# Patient Record
Sex: MALE | Race: WHITE | NOT HISPANIC OR LATINO | URBAN - METROPOLITAN AREA
[De-identification: names, ages, dates, MRNs, and addresses within clinical notes are randomized per-mention and may not be internally consistent; named-entity substitution may affect disease eponyms.]

---
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
## 2025-07-08 NOTE — PROGRESS NOTES
"Subjective   History was provided by the {relatives:67005}.  Adarsh Ware is a 20 y.o. male who is here for this well visit.  - hrg    Current Issues:  Current concerns include ***.  - optometry/no glasses  Multiple benign nevi  - derm qyr?    Sleep: all night  Dental:  brushes teeth 2x/d - sees dentist  No issues using restroom  Testicular self-exams discussed    Review of Nutrition:  Current diet: adequate milk/Vit D source   Adequate fruit/vegetable intake    Social Screening:   Grade:  [employer] rising [employer]  Activities:  gets regular exercise {sports:67575}    Job:  Yes    Safety:  Risk factors for sexually-transmitted infections: {Expan STI risks:56346::\"no\"}  Using alcohol/drug use:  {yes***/no:42246::\"no\"}  Tobacco/nicotine use:  {Artify It TOBACCO HISTORY:25831::\"no use\"}  Uses seat belt - no texting while driving    Screening Questions:      No data recorded     No results found.     Objective   There were no vitals taken for this visit.  Physical Exam  Constitutional:       Appearance: Normal appearance.   HENT:      Right Ear: Tympanic membrane normal.      Left Ear: Tympanic membrane normal.      Nose: Nose normal.      Mouth/Throat:      Mouth: Mucous membranes are moist.      Pharynx: Oropharynx is clear.   Eyes:      Extraocular Movements: Extraocular movements intact.   Cardiovascular:      Rate and Rhythm: Normal rate and regular rhythm.      Pulses:           Radial pulses are 2+ on the right side and 2+ on the left side.      Heart sounds: No murmur heard.  Pulmonary:      Effort: Pulmonary effort is normal.      Breath sounds: Normal breath sounds.   Abdominal:      General: Abdomen is flat.      Palpations: Abdomen is soft. There is no hepatomegaly, splenomegaly or mass.   Genitourinary:     Penis: Normal.       Testes: Normal.         Right: Testicular hydrocele not present.         Left: Testicular hydrocele not present.      Quinton stage (genital): 5.   Musculoskeletal:         " General: Normal range of motion.      Cervical back: Normal range of motion and neck supple.      Thoracic back: No scoliosis.      Lumbar back: No scoliosis.   Lymphadenopathy:      Cervical: No cervical adenopathy.   Skin:     General: Skin is warm and dry.   Neurological:      General: No focal deficit present.      Mental Status: He is alert.      Deep Tendon Reflexes:      Reflex Scores:       Patellar reflexes are 2+ on the right side and 2+ on the left side.  Psychiatric:         Mood and Affect: Mood normal.         Behavior: Behavior normal.         Assessment/Plan   20 y.o. male w/ NL G+D  1. Anticipatory guidance discussed.   2. Cleared for school/sports  3. Vaccine, if given, discussed and consented.  4. Follow up in 1 year for next well child exam or sooner with concerns.

## 2025-07-14 ENCOUNTER — APPOINTMENT (OUTPATIENT)
Dept: PEDIATRICS | Facility: CLINIC | Age: 20
End: 2025-07-14
Payer: COMMERCIAL

## 2025-07-14 VITALS
SYSTOLIC BLOOD PRESSURE: 123 MMHG | BODY MASS INDEX: 24.91 KG/M2 | HEART RATE: 71 BPM | HEIGHT: 70 IN | WEIGHT: 174 LBS | DIASTOLIC BLOOD PRESSURE: 72 MMHG

## 2025-07-14 DIAGNOSIS — D22.9 MULTIPLE BENIGN NEVI: ICD-10-CM

## 2025-07-14 DIAGNOSIS — Z00.00 WELL ADULT EXAM: Primary | ICD-10-CM

## 2025-07-14 DIAGNOSIS — L64.9 ALOPECIA, MALE PATTERN: ICD-10-CM

## 2025-07-14 PROCEDURE — 99395 PREV VISIT EST AGE 18-39: CPT | Performed by: PEDIATRICS

## 2025-07-14 PROCEDURE — 92552 PURE TONE AUDIOMETRY AIR: CPT | Performed by: PEDIATRICS

## 2025-07-14 PROCEDURE — 3008F BODY MASS INDEX DOCD: CPT | Performed by: PEDIATRICS

## 2025-07-14 PROCEDURE — 96127 BRIEF EMOTIONAL/BEHAV ASSMT: CPT | Performed by: PEDIATRICS

## 2025-07-14 ASSESSMENT — PATIENT HEALTH QUESTIONNAIRE - PHQ9
5. POOR APPETITE OR OVEREATING: NOT AT ALL
6. FEELING BAD ABOUT YOURSELF - OR THAT YOU ARE A FAILURE OR HAVE LET YOURSELF OR YOUR FAMILY DOWN: SEVERAL DAYS
4. FEELING TIRED OR HAVING LITTLE ENERGY: SEVERAL DAYS
2. FEELING DOWN, DEPRESSED OR HOPELESS: NOT AT ALL
8. MOVING OR SPEAKING SO SLOWLY THAT OTHER PEOPLE COULD HAVE NOTICED. OR THE OPPOSITE - BEING SO FIDGETY OR RESTLESS THAT YOU HAVE BEEN MOVING AROUND A LOT MORE THAN USUAL: NOT AT ALL
2. FEELING DOWN, DEPRESSED OR HOPELESS: NOT AT ALL
SUM OF ALL RESPONSES TO PHQ QUESTIONS 1-9: 3
3. TROUBLE FALLING OR STAYING ASLEEP OR SLEEPING TOO MUCH: SEVERAL DAYS
SUM OF ALL RESPONSES TO PHQ9 QUESTIONS 1 & 2: 0
9. THOUGHTS THAT YOU WOULD BE BETTER OFF DEAD, OR OF HURTING YOURSELF: NOT AT ALL
5. POOR APPETITE OR OVEREATING: NOT AT ALL
7. TROUBLE CONCENTRATING ON THINGS, SUCH AS READING THE NEWSPAPER OR WATCHING TELEVISION: NOT AT ALL
7. TROUBLE CONCENTRATING ON THINGS, SUCH AS READING THE NEWSPAPER OR WATCHING TELEVISION: NOT AT ALL
9. THOUGHTS THAT YOU WOULD BE BETTER OFF DEAD, OR OF HURTING YOURSELF: NOT AT ALL
6. FEELING BAD ABOUT YOURSELF - OR THAT YOU ARE A FAILURE OR HAVE LET YOURSELF OR YOUR FAMILY DOWN: SEVERAL DAYS
10. IF YOU CHECKED OFF ANY PROBLEMS, HOW DIFFICULT HAVE THESE PROBLEMS MADE IT FOR YOU TO DO YOUR WORK, TAKE CARE OF THINGS AT HOME, OR GET ALONG WITH OTHER PEOPLE: NOT DIFFICULT AT ALL
1. LITTLE INTEREST OR PLEASURE IN DOING THINGS: NOT AT ALL
4. FEELING TIRED OR HAVING LITTLE ENERGY: SEVERAL DAYS
1. LITTLE INTEREST OR PLEASURE IN DOING THINGS: NOT AT ALL
3. TROUBLE FALLING OR STAYING ASLEEP: SEVERAL DAYS
10. IF YOU CHECKED OFF ANY PROBLEMS, HOW DIFFICULT HAVE THESE PROBLEMS MADE IT FOR YOU TO DO YOUR WORK, TAKE CARE OF THINGS AT HOME, OR GET ALONG WITH OTHER PEOPLE: NOT DIFFICULT AT ALL
8. MOVING OR SPEAKING SO SLOWLY THAT OTHER PEOPLE COULD HAVE NOTICED. OR THE OPPOSITE, BEING SO FIGETY OR RESTLESS THAT YOU HAVE BEEN MOVING AROUND A LOT MORE THAN USUAL: NOT AT ALL

## 2025-07-14 ASSESSMENT — ANXIETY QUESTIONNAIRES
2. NOT BEING ABLE TO STOP OR CONTROL WORRYING: NOT AT ALL
IF YOU CHECKED OFF ANY PROBLEMS ON THIS QUESTIONNAIRE, HOW DIFFICULT HAVE THESE PROBLEMS MADE IT FOR YOU TO DO YOUR WORK, TAKE CARE OF THINGS AT HOME, OR GET ALONG WITH OTHER PEOPLE: NOT DIFFICULT AT ALL
7. FEELING AFRAID AS IF SOMETHING AWFUL MIGHT HAPPEN: SEVERAL DAYS
1. FEELING NERVOUS, ANXIOUS, OR ON EDGE: SEVERAL DAYS
4. TROUBLE RELAXING: NOT AT ALL
5. BEING SO RESTLESS THAT IT IS HARD TO SIT STILL: SEVERAL DAYS
2. NOT BEING ABLE TO STOP OR CONTROL WORRYING: NOT AT ALL
4. TROUBLE RELAXING: NOT AT ALL
3. WORRYING TOO MUCH ABOUT DIFFERENT THINGS: SEVERAL DAYS
7. FEELING AFRAID AS IF SOMETHING AWFUL MIGHT HAPPEN: SEVERAL DAYS
6. BECOMING EASILY ANNOYED OR IRRITABLE: SEVERAL DAYS
GAD7 TOTAL SCORE: 5
IF YOU CHECKED OFF ANY PROBLEMS ON THIS QUESTIONNAIRE, HOW DIFFICULT HAVE THESE PROBLEMS MADE IT FOR YOU TO DO YOUR WORK, TAKE CARE OF THINGS AT HOME, OR GET ALONG WITH OTHER PEOPLE: NOT DIFFICULT AT ALL
3. WORRYING TOO MUCH ABOUT DIFFERENT THINGS: SEVERAL DAYS
1. FEELING NERVOUS, ANXIOUS, OR ON EDGE: SEVERAL DAYS
6. BECOMING EASILY ANNOYED OR IRRITABLE: SEVERAL DAYS
5. BEING SO RESTLESS THAT IT IS HARD TO SIT STILL: SEVERAL DAYS

## 2025-07-29 ENCOUNTER — OFFICE VISIT (OUTPATIENT)
Dept: URGENT CARE | Age: 20
End: 2025-07-29
Payer: COMMERCIAL

## 2025-07-29 VITALS
SYSTOLIC BLOOD PRESSURE: 125 MMHG | DIASTOLIC BLOOD PRESSURE: 72 MMHG | RESPIRATION RATE: 18 BRPM | HEART RATE: 72 BPM | OXYGEN SATURATION: 97 % | TEMPERATURE: 98 F

## 2025-07-29 DIAGNOSIS — H61.21 IMPACTED CERUMEN OF RIGHT EAR: Primary | ICD-10-CM

## 2025-07-29 PROCEDURE — 69209 REMOVE IMPACTED EAR WAX UNI: CPT | Performed by: NURSE PRACTITIONER

## 2025-07-29 PROCEDURE — 99203 OFFICE O/P NEW LOW 30 MIN: CPT | Performed by: NURSE PRACTITIONER

## 2025-07-29 ASSESSMENT — ENCOUNTER SYMPTOMS
COUGH: 0
CHILLS: 0
FEVER: 0
DIZZINESS: 0
SHORTNESS OF BREATH: 0
HEADACHES: 0

## 2025-07-29 ASSESSMENT — PAIN SCALES - GENERAL: PAINLEVEL_OUTOF10: 0-NO PAIN

## 2025-07-29 NOTE — PATIENT INSTRUCTIONS
Thank you for letting me care for you today    Use debrox 2 x a day for the next 72 hours and then return for removal    Return sooner if needed    Some people get earwax buildup simply because they naturally produce more earwax.    You can also get earwax impaction if:    You have dry or hard earwax.  You have a lot of ear hair.  You have narrow ear canals.  You frequently wear earplugs or hearing aids.  You routinely use cotton swabs to clean your ears.    Clean the outside of your ear with a washcloth. After showering or bathing, dry your ears as much as possible.    You should never, under any circumstances, put anything inside your ear canal -- including cotton swabs. Not only can using cotton swabs damage your eardrum, but it can also encourage your ears to make more earwax.     You can use cerumenolytic solutions (solutions to dissolve wax) in your ear canal. These solutions include:    Saline solution.  Baby oil.  Glycerin.  Mineral oil.  Hydrogen peroxide or peroxide-based ear drops (such as Debrox®).    With these solutions, you put a few drops into the affected ear and lie on the opposite side. This way, the solution can drip into your affected ear. These oils should be used sparingly. If using an over-the-counter earwax removal, follow the directions provided.

## 2025-07-29 NOTE — PROGRESS NOTES
Subjective   Patient ID: Adarsh Ware is a 20 y.o. male. They present today with a chief complaint of Other (Pt states rt ear clogged, started today. Pressure and muffled ).    History of Present Illness  20-year-old male here today for evaluation of right ear.  Patient states he had a yearly visit about 2 weeks ago and was told his right ear was clogged.  He was told he Debrox and try to flush it with peroxide.  He tried last night and this morning and was unable to unclog his ear.  He does wear earbuds and does use Q-tips.  Denies otic trauma, drainage. Denies additional URI symptoms. Denies known fever. Denies chills.          Past Medical History  Allergies as of 07/29/2025 - Reviewed 07/29/2025   Allergen Reaction Noted    Cefdinir Rash 04/27/2023    Loratadine Unknown 04/27/2023       Prescriptions Prior to Admission[1]     Medical History[2]    Surgical History[3]         Review of Systems  Review of Systems   Constitutional:  Negative for chills and fever.   HENT:  Positive for ear pain. Negative for ear discharge and tinnitus.    Respiratory:  Negative for cough and shortness of breath.    Cardiovascular:  Negative for chest pain.   Neurological:  Negative for dizziness and headaches.   All other systems reviewed and are negative.                                 Objective    Vitals:    07/29/25 1511   BP: 125/72   Pulse: 72   Resp: 18   Temp: 36.7 °C (98 °F)   TempSrc: Oral   SpO2: 97%     No LMP for male patient.    Physical Exam  Constitutional:       Appearance: Normal appearance.   HENT:      Head: Normocephalic and atraumatic.      Right Ear: External ear normal. There is impacted cerumen.      Left Ear: Tympanic membrane, ear canal and external ear normal.      Nose: Nose normal.      Mouth/Throat:      Mouth: Mucous membranes are moist.     Eyes:      Conjunctiva/sclera: Conjunctivae normal.       Cardiovascular:      Rate and Rhythm: Normal rate and regular rhythm.      Pulses: Normal pulses.       Heart sounds: Normal heart sounds.   Pulmonary:      Effort: Pulmonary effort is normal.      Breath sounds: Normal breath sounds.     Musculoskeletal:         General: Normal range of motion.      Cervical back: Normal range of motion and neck supple.     Skin:     General: Skin is warm and dry.     Neurological:      General: No focal deficit present.      Mental Status: He is alert.         Ear Cerumen Removal    Date/Time: 7/29/2025 3:28 PM    Performed by: Kristin L Schoenlein, APRN-CNP  Authorized by: Kristin L Schoenlein, APRN-CNP    Consent:     Consent obtained:  Verbal    Consent given by:  Patient    Risks, benefits, and alternatives were discussed: yes      Risks discussed:  Bleeding, dizziness, infection, incomplete removal, pain and TM perforation    Alternatives discussed:  Referral, observation, alternative treatment, no treatment and delayed treatment  Universal protocol:     Patient identity confirmed:  Verbally with patient  Procedure details:     Location:  R ear    Procedure type: irrigation      Procedure outcomes: unable to remove cerumen    Post-procedure details:     Inspection:  Some cerumen remaining    Hearing quality:  Improved    Procedure completion:  Procedure terminated at patient's request      Point of Care Test & Imaging Results from this visit  No results found for this visit on 07/29/25.   Imaging  No results found.    Cardiology, Vascular, and Other Imaging  No other imaging results found for the past 2 days      Diagnostic study results (if any) were reviewed by Kristin L Schoenlein, APRN-CNP.    Assessment/Plan   Allergies, medications, history, and pertinent labs/EKGs/Imaging reviewed by Kristin L Schoenlein, APRN-CNP.     Medical Decision Making  VSS, NAD, Nontoxic appearing. Physical exam as documented above. Patient consented for ear irrigation.  Partial removal of cerumen cerumen was complete and hearing improved.  Patient could not tolerate removal secondary to pain it  was stopped.    Symptoms, history, and exam are consistent with: Cerumen impaction.  We discussed how to use Debrox for the next 72 hours and to return for further irrigation.  Supportive care, Follow up, and Strict ED precautions reviewed.     Differential Dx include, however, are not limited to: Otitis media, otitis externa, , FB, TM rupture, Mastoiditis    I have a low suspicion for any acute pathologies requiring emergent evaluation and further workup at this time.  I believe patient is safe to discharge home with a low threshold for emergency room as discussed during visit.  Medication(s) profile of OTC and Rxed medication(s) if prescribed was (were) reviewed.  All questions answered and addressed.  Patient verbalized understanding.      Orders and Diagnoses  Diagnoses and all orders for this visit:  Impacted cerumen of right ear  Other orders  -     Ear Cerumen Removal      Medical Admin Record      Patient disposition: Home    Electronically signed by Kristin L Schoenlein, APRN-CNP  4:21 PM           [1] (Not in a hospital admission)   [2] History reviewed. No pertinent past medical history.  [3] History reviewed. No pertinent surgical history.

## 2025-08-02 ENCOUNTER — OFFICE VISIT (OUTPATIENT)
Dept: URGENT CARE | Age: 20
End: 2025-08-02
Payer: COMMERCIAL

## 2025-08-02 VITALS
DIASTOLIC BLOOD PRESSURE: 70 MMHG | SYSTOLIC BLOOD PRESSURE: 111 MMHG | HEART RATE: 70 BPM | TEMPERATURE: 95.8 F | RESPIRATION RATE: 18 BRPM | OXYGEN SATURATION: 100 %

## 2025-08-02 DIAGNOSIS — H61.21 IMPACTED CERUMEN OF RIGHT EAR: Primary | ICD-10-CM

## 2025-08-02 ASSESSMENT — PATIENT HEALTH QUESTIONNAIRE - PHQ9
SUM OF ALL RESPONSES TO PHQ9 QUESTIONS 1 AND 2: 0
2. FEELING DOWN, DEPRESSED OR HOPELESS: NOT AT ALL
1. LITTLE INTEREST OR PLEASURE IN DOING THINGS: NOT AT ALL

## 2025-08-02 ASSESSMENT — ENCOUNTER SYMPTOMS
LOSS OF SENSATION IN FEET: 0
OCCASIONAL FEELINGS OF UNSTEADINESS: 0
DEPRESSION: 0

## 2025-08-02 NOTE — PROGRESS NOTES
Subjective   Patient ID: Adarsh Ware is a 20 y.o. male. They present today with a chief complaint of ear cleaning (Pt states that he was here a couple days ago, and was told to apply debrox in right ear, 2x a day for a 3/4 days and he's her to get the rest of the debris out.  ).    History of Present Illness  HPI    20-year-old male patient presents today for follow-up.  He was at urgent care a few days ago for impacted cerumen in the right ear that was unable to be removed with ear irrigation.  He has been using Debrox at home to help soften the wax and is here for reevaluation.  He is not having any dizziness or lightheadedness, and states that he feels as though the wax has cleared from his right ear compared to what it was.  He is here for evaluation.  Past Medical History  Allergies as of 08/02/2025 - Reviewed 08/02/2025   Allergen Reaction Noted    Cefdinir Rash 04/27/2023    Loratadine Unknown 04/27/2023       Prescriptions Prior to Admission[1]     Medical History[2]    Surgical History[3]     reports that he has never smoked. He has never been exposed to tobacco smoke. He has never used smokeless tobacco. He reports that he does not currently use alcohol. He reports that he does not currently use drugs.    Review of Systems  Review of Systems   HENT:          Impacted cerumen right ear                                  Objective    Vitals:    08/02/25 1543   BP: 111/70   BP Location: Right arm   Patient Position: Sitting   Pulse: 70   Resp: 18   Temp: 35.4 °C (95.8 °F)   SpO2: 100%     No LMP for male patient.    Physical Exam  HENT:      Right Ear: There is impacted cerumen.      Left Ear: Tympanic membrane normal.         Ear Cerumen Removal    Date/Time: 8/2/2025 4:27 PM    Performed by: GHANSHYAM Vazquez  Authorized by: GHANSHYAM Vazquez    Consent:     Consent obtained:  Verbal    Consent given by:  Patient    Risks, benefits, and alternatives were discussed: yes     Procedure details:     Location:  R ear    Procedure type: irrigation      Procedure outcomes: cerumen removed    Post-procedure details:     Inspection:  No bleeding and ear canal clear    Hearing quality:  Improved    Procedure completion:  Tolerated well, no immediate complications      Point of Care Test & Imaging Results from this visit  No results found for this visit on 08/02/25.   Imaging  No results found.    Cardiology, Vascular, and Other Imaging  No other imaging results found for the past 2 days      Diagnostic study results (if any) were reviewed by GHANSHYAM Lo.    Assessment/Plan   Allergies, medications, history, and pertinent labs/EKGs/Imaging reviewed by GHANSHYAM Lo.     Medical Decision Making  Small amount of cerumen noted in the right ear; irrigated and removal was successful.  No concern for infection at this time.  Patient denies any dizziness, lightheadedness, or pain at the time of discharge.  He is hemodynamically stable and in no acute distress.    Orders and Diagnoses  Diagnoses and all orders for this visit:  Impacted cerumen of right ear  Other orders  -     Ear Cerumen Removal      Medical Admin Record      Patient disposition: Home    Electronically signed by GHANSHYAM Lo  4:30 PM           [1] (Not in a hospital admission)   [2] History reviewed. No pertinent past medical history.  [3] History reviewed. No pertinent surgical history.